# Patient Record
Sex: FEMALE | Race: WHITE | NOT HISPANIC OR LATINO | URBAN - METROPOLITAN AREA
[De-identification: names, ages, dates, MRNs, and addresses within clinical notes are randomized per-mention and may not be internally consistent; named-entity substitution may affect disease eponyms.]

---

## 2023-08-31 ENCOUNTER — TELEPHONE (OUTPATIENT)
Dept: ENDOSCOPY | Facility: HOSPITAL | Age: 58
End: 2023-08-31
Payer: COMMERCIAL

## 2023-08-31 NOTE — TELEPHONE ENCOUNTER
----- Message from Susana Dawkins sent at 8/31/2023  4:45 PM CDT -----  Regarding: Appt  Contact: Pt [phone]  Pt is calling to schedule a appt please call

## 2023-09-12 ENCOUNTER — TELEPHONE (OUTPATIENT)
Dept: ENDOSCOPY | Facility: HOSPITAL | Age: 58
End: 2023-09-12
Payer: COMMERCIAL

## 2023-09-12 NOTE — TELEPHONE ENCOUNTER
----- Message from Korin Law sent at 9/12/2023  4:24 PM CDT -----  Regarding: New Pt Harry Pancreas Cyst or Lesion  Contact: [phone]  Pt and [facility] rep (Maame) on the phone conference requesting an appointment for Pancreas Lesion/Cyst.  I attempted to schedule with no available pt requesting to see Dr. Jacobson.  Please call pt to discuss further.

## 2023-09-14 ENCOUNTER — TELEPHONE (OUTPATIENT)
Dept: ENDOSCOPY | Facility: HOSPITAL | Age: 58
End: 2023-09-14
Payer: COMMERCIAL

## 2023-09-14 NOTE — TELEPHONE ENCOUNTER
----- Message from Ernie Pizarro sent at 9/14/2023  8:22 AM CDT -----  Regarding: appt access  Contact: pt [phone]  PATIENT CALL    Pt returning Candice's call to schedule an appt to address pancreatic cysts/lesions. Referral in hand from PCP. Please call pt at [phone] to discuss

## 2023-11-02 ENCOUNTER — PATIENT MESSAGE (OUTPATIENT)
Dept: ENDOSCOPY | Facility: HOSPITAL | Age: 58
End: 2023-11-02
Payer: COMMERCIAL

## 2023-11-02 ENCOUNTER — TELEPHONE (OUTPATIENT)
Dept: ENDOSCOPY | Facility: HOSPITAL | Age: 58
End: 2023-11-02
Payer: COMMERCIAL

## 2023-11-02 ENCOUNTER — OFFICE VISIT (OUTPATIENT)
Dept: GASTROENTEROLOGY | Facility: CLINIC | Age: 58
End: 2023-11-02
Payer: COMMERCIAL

## 2023-11-02 VITALS
HEIGHT: 66 IN | BODY MASS INDEX: 24.1 KG/M2 | HEART RATE: 77 BPM | SYSTOLIC BLOOD PRESSURE: 120 MMHG | WEIGHT: 149.94 LBS | DIASTOLIC BLOOD PRESSURE: 73 MMHG

## 2023-11-02 DIAGNOSIS — K86.2 PANCREAS CYST: Primary | ICD-10-CM

## 2023-11-02 DIAGNOSIS — R63.4 WEIGHT LOSS: ICD-10-CM

## 2023-11-02 DIAGNOSIS — R10.11 RUQ PAIN: ICD-10-CM

## 2023-11-02 DIAGNOSIS — E11.65 UNCONTROLLED TYPE 2 DIABETES MELLITUS WITH HYPERGLYCEMIA: ICD-10-CM

## 2023-11-02 PROCEDURE — 99999 PR PBB SHADOW E&M-EST. PATIENT-LVL III: CPT | Mod: PBBFAC,,, | Performed by: INTERNAL MEDICINE

## 2023-11-02 PROCEDURE — 99204 PR OFFICE/OUTPT VISIT, NEW, LEVL IV, 45-59 MIN: ICD-10-PCS | Mod: S$GLB,,, | Performed by: INTERNAL MEDICINE

## 2023-11-02 PROCEDURE — 1160F RVW MEDS BY RX/DR IN RCRD: CPT | Mod: CPTII,S$GLB,, | Performed by: INTERNAL MEDICINE

## 2023-11-02 PROCEDURE — 99999 PR PBB SHADOW E&M-EST. PATIENT-LVL III: ICD-10-PCS | Mod: PBBFAC,,, | Performed by: INTERNAL MEDICINE

## 2023-11-02 PROCEDURE — 3078F PR MOST RECENT DIASTOLIC BLOOD PRESSURE < 80 MM HG: ICD-10-PCS | Mod: CPTII,S$GLB,, | Performed by: INTERNAL MEDICINE

## 2023-11-02 PROCEDURE — 1160F PR REVIEW ALL MEDS BY PRESCRIBER/CLIN PHARMACIST DOCUMENTED: ICD-10-PCS | Mod: CPTII,S$GLB,, | Performed by: INTERNAL MEDICINE

## 2023-11-02 PROCEDURE — 3074F SYST BP LT 130 MM HG: CPT | Mod: CPTII,S$GLB,, | Performed by: INTERNAL MEDICINE

## 2023-11-02 PROCEDURE — 3008F PR BODY MASS INDEX (BMI) DOCUMENTED: ICD-10-PCS | Mod: CPTII,S$GLB,, | Performed by: INTERNAL MEDICINE

## 2023-11-02 PROCEDURE — 1159F MED LIST DOCD IN RCRD: CPT | Mod: CPTII,S$GLB,, | Performed by: INTERNAL MEDICINE

## 2023-11-02 PROCEDURE — 99204 OFFICE O/P NEW MOD 45 MIN: CPT | Mod: S$GLB,,, | Performed by: INTERNAL MEDICINE

## 2023-11-02 PROCEDURE — 1159F PR MEDICATION LIST DOCUMENTED IN MEDICAL RECORD: ICD-10-PCS | Mod: CPTII,S$GLB,, | Performed by: INTERNAL MEDICINE

## 2023-11-02 PROCEDURE — 3078F DIAST BP <80 MM HG: CPT | Mod: CPTII,S$GLB,, | Performed by: INTERNAL MEDICINE

## 2023-11-02 PROCEDURE — 3008F BODY MASS INDEX DOCD: CPT | Mod: CPTII,S$GLB,, | Performed by: INTERNAL MEDICINE

## 2023-11-02 PROCEDURE — 3074F PR MOST RECENT SYSTOLIC BLOOD PRESSURE < 130 MM HG: ICD-10-PCS | Mod: CPTII,S$GLB,, | Performed by: INTERNAL MEDICINE

## 2023-11-02 RX ORDER — TAMOXIFEN CITRATE 20 MG/1
20 TABLET ORAL
COMMUNITY
Start: 2023-09-19

## 2023-11-02 RX ORDER — INSULIN LISPRO 100 [IU]/ML
5 INJECTION, SOLUTION INTRAVENOUS; SUBCUTANEOUS
COMMUNITY
Start: 2023-08-17

## 2023-11-02 RX ORDER — SIMVASTATIN 40 MG/1
1 TABLET, FILM COATED ORAL DAILY
COMMUNITY
Start: 2022-11-17

## 2023-11-02 RX ORDER — CETIRIZINE HYDROCHLORIDE 10 MG/1
10 TABLET ORAL NIGHTLY
COMMUNITY

## 2023-11-02 RX ORDER — FLAXSEED OIL 1000 MG
1000 CAPSULE ORAL
COMMUNITY

## 2023-11-02 RX ORDER — SERTRALINE HYDROCHLORIDE 100 MG/1
100 TABLET, FILM COATED ORAL NIGHTLY
COMMUNITY

## 2023-11-02 RX ORDER — SITAGLIPTIN AND METFORMIN HYDROCHLORIDE 1000; 50 MG/1; MG/1
1 TABLET, FILM COATED, EXTENDED RELEASE ORAL 2 TIMES DAILY
COMMUNITY

## 2023-11-02 NOTE — TELEPHONE ENCOUNTER
Spoke to Pt to schedule procedure(s) Upper Endoscopy Ultrasound (EUS)       Physician to perform procedure(s) Dr. PATRICE Meraz  Date of Procedure (s) 11/8/23  Arrival Time 1:15 PM  Time of Procedure(s) 2:15 PM   Location of Procedure(s) [facility] [facility] [address] Floor  Type of Rx Prep sent to patient: N/A  Instructions provided to patient via [facility]    Patient was informed on the following information and verbalized understanding. Screening questionnaire reviewed with patient and complete. If procedure requires anesthesia, a responsible adult needs to be present to accompany the patient home, patient cannot drive after receiving anesthesia. Appointment details are tentative, especially check-in time. Patient will receive a prep-op call 4 days prior to confirm check-in time for procedure. If applicable the patient should contact their pharmacy to verify Rx for procedure prep is ready for pick-up. Patient was advised to call the scheduling department at [phone] if pharmacy states no Rx is available. Patient was advised to call the endoscopy scheduling department if any questions or concerns arise.      [facility] Scheduling Department

## 2023-11-02 NOTE — TELEPHONE ENCOUNTER
Telephoned pt to schedule urgent EUS with no answer.  Voicemail message left with direct contact number for pt to return call.

## 2023-11-02 NOTE — PROGRESS NOTES
Gastroenterology: [facility]      SUBJECTIVE:         Chief Complaint: Here for evaluation of a pancreatic cyst     History of Present Illness:  Patient is a 58 y.o. female presents with multiple pancreatic cyst. The cysts were first noted 3/2022. It's located in the head, the body.  It measures <10  mm in size on the most recent imaging.  It is not symptomatic. Previous studies include CT scan.   Since initial detection, the cyst has not increased in size. The pancreatic duct is not dilated.    Previous FNA of the cyst has not been done    Prior Imaging:    CT scan 7/10/2023    Pancreas: Subcentimeter hypodense cystic lesions scattered throughout the pancreas are unchanged in size and number since the 07/10/2023 CT abdomen pelvis. The largest lesion is located in the pancreatic body and measures 0.6 cm in longest dimension.   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 For instance, a pancreatic body lesion denoted by an arrow (601 b image 42). Some   of these lesions do not have visualized connections which is likely due to resolution and slice thickness. However, cystic lesions are also heavily favored to represent branch duct type IPMN's.     Personal/family factors:    There is not a family history of pancreatic cancer     The patient does smoke.    ECOG status 0 - Asymptomatic    The patient stated worsening diabetes for the last 3 months and a weight loss of 14 pounds in 1.5 months. No diarrhea. Stated RUQ pain with food intake. Minimal used of NSAID's.    Review of Systems   Constitutional: Stated weight loss  Respiratory: no cough or shortness of breath   Cardiovascular: no chest pain or palpitations   Gastrointestinal: as per HPI      Past Medical History:   Diagnosis Date    Breast cancer     Hypertriglyceridemia     Type 2 diabetes mellitus without  "complications        History reviewed. No pertinent surgical history.    History reviewed. No pertinent family history.    Social History     Socioeconomic History    Marital status:    Tobacco Use    Smoking status: Every Day     Types: Cigarettes       Current Outpatient Medications on File Prior to Visit   Medication Sig Dispense Refill    cetirizine (ZYRTEC) 10 MG tablet Take 10 mg by mouth every evening.      empagliflozin (JARDIANCE) 25 mg tablet Take 25 mg by mouth.      flaxseed oiL 1,000 mg Cap Take 1,000 mg by mouth.      insulin lispro 100 unit/mL injection Inject 5 Units into the skin.      JANUMET XR 50-1,000 mg TM24 Take 1 tablet by mouth 2 (two) times daily.      multivitamin-minerals-lutein Tab Take 1 tablet by mouth once daily.      sertraline (ZOLOFT) 100 MG tablet Take 100 mg by mouth every evening.      simvastatin (ZOCOR) 40 MG tablet Take 1 tablet by mouth once daily.      tamoxifen (NOLVADEX) 20 MG Tab Take 20 mg by mouth.       No current facility-administered medications on file prior to visit.       Review of patient's allergies indicates:  No Known Allergies    Physical Exam:  General: Well-developed, well-appearing, no acute distress          Laboratory:   Lab Results   Component Value Date    ALT 14 10/11/2023    AST 20 10/11/2023    ALKPHOS 39 10/11/2023    BILITOT 0.4 10/11/2023     No results found for: "WBC", "HGB", "HCT", "MCV", "PLT"  No results found for: "INR", "PROTIME"        Diagnostic/Imaging Results:  As above    ASSESSMENT/PLAN:     1)Multiple pancreatic cyst in the head, the body. Measuring < 10 mm in size,  unchanged  Most likely etiology at this point is a IPMN    We discussed in detail the natural history of pancreatic cysts, the therapy involved, and the benefits of multimodality surveillance imaging including Ct, MRI, and EUS with FNA  2) Weight loss  3) RUQ abdominal pain   4) Worsening diabetes    Plan:     Pancreatic cyst- Will tentatively plan for EUS; " pending final review and consensus discussion at multidisciplinary pancreatic cyst conference.      The impression and plan was discussed in detail with the patient and family. All questions have been answered and the patient voices understanding of our plan at this point. The risk of the procedure was discussed in detail which includes but not limited to bleeding, infection, perforation in some cases requiring surgery with its spectrum of complications.      Need to upload the outside facility imaging.    David Magdaleno MD  [facility]  Department of Gastroenterology

## 2023-11-07 ENCOUNTER — TELEPHONE (OUTPATIENT)
Dept: ENDOSCOPY | Facility: HOSPITAL | Age: 58
End: 2023-11-07
Payer: COMMERCIAL

## 2023-11-07 ENCOUNTER — ANESTHESIA EVENT (OUTPATIENT)
Dept: ENDOSCOPY | Facility: HOSPITAL | Age: 58
End: 2023-11-07
Payer: COMMERCIAL

## 2023-11-07 NOTE — TELEPHONE ENCOUNTER
Left message instructing patient to call dept @ [phone] between 8am-4pm.    Arrival time to be given @ *121  (Message sent via [facility] portal)

## 2023-11-07 NOTE — ANESTHESIA PREPROCEDURE EVALUATION
[facility]  Anesthesia Pre-Operative Evaluation         Patient Name: Chas Malik  YOB: 1965  MRN: [id number]    SUBJECTIVE:     11/07/2023    Procedure(s) (LRB):  ULTRASOUND, UPPER GI TRACT, ENDOSCOPIC (N/A)    Chas Malik is a 58 y.o. female here for Procedure(s) (LRB):  ULTRASOUND, UPPER GI TRACT, ENDOSCOPIC (N/A)    Drips:     There is no problem list on file for this patient.      Review of patient's allergies indicates:  No Known Allergies    No current facility-administered medications on file prior to encounter.     Current Outpatient Medications on File Prior to Encounter   Medication Sig Dispense Refill    cetirizine (ZYRTEC) 10 MG tablet Take 10 mg by mouth every evening.      empagliflozin (JARDIANCE) 25 mg tablet Take 25 mg by mouth.      flaxseed oiL 1,000 mg Cap Take 1,000 mg by mouth.      insulin lispro 100 unit/mL injection Inject 5 Units into the skin.      JANUMET XR 50-1,000 mg TM24 Take 1 tablet by mouth 2 (two) times daily.      multivitamin-minerals-lutein Tab Take 1 tablet by mouth once daily.      sertraline (ZOLOFT) 100 MG tablet Take 100 mg by mouth every evening.      simvastatin (ZOCOR) 40 MG tablet Take 1 tablet by mouth once daily.      tamoxifen (NOLVADEX) 20 MG Tab Take 20 mg by mouth.         No past surgical history on file.    Social History     Socioeconomic History    Marital status:    Tobacco Use    Smoking status: Every Day     Types: Cigarettes         OBJECTIVE:     Vital Signs Range (Last 24H):       Significant Labs:  Lab Results   Component Value Date    ALT 14 10/11/2023    AST 20 10/11/2023     (H) 10/11/2023    K 3.7 10/11/2023    CREATININE 0.74 10/11/2023    BUN 13 10/11/2023    CO2 26 10/11/2023     Pre-op Assessment    I have reviewed the Patient Summary Reports.     I have reviewed the Nursing Notes. I have reviewed the NPO Status.   I have reviewed the Medications.     Review of Systems  Anesthesia Hx:  No problems  with previous Anesthesia   History of prior surgery of interest to airway management or planning:            Denies Personal Hx of Anesthesia complications.                    Social:  Smoker Stopped for 1 year and then recently restarted smoking       Hematology/Oncology:                      Current/Recent Cancer.  Breast    right axillary node dissection  chemotherapy and surgery       Cardiovascular:      Denies Hypertension.   Denies MI.            hyperlipidemia                             Pulmonary:  Pulmonary Normal   Denies COPD.  Denies Asthma.     Denies Sleep Apnea.                Renal/:  Renal/ Normal  Denies Chronic Renal Disease.                Hepatic/GI:     GERD Denies Liver Disease.            Neurological:  Neurology Normal Denies TIA.  Denies CVA.    Denies Seizures.                                Endocrine:  Diabetes Denies Hypothyroidism.  Denies Hyperthyroidism.             Physical Exam  General: Well nourished, Cooperative, Oriented and Alert    Airway:  Mallampati: II / II  Mouth Opening: Normal  TM Distance: Normal  Tongue: Normal    Dental:  Intact        Anesthesia Plan  Type of Anesthesia, risks & benefits discussed:    Anesthesia Type: Gen Natural Airway  Intra-op Monitoring Plan: Standard ASA Monitors  Post Op Pain Control Plan: multimodal analgesia  Induction:  IV  Informed Consent: Informed consent signed with the Patient and all parties understand the risks and agree with anesthesia plan.  All questions answered.   ASA Score: 3  Day of Surgery Review of History & Physical: H&P Update referred to the surgeon/provider.  Anesthesia Plan Notes:         Ready For Surgery From Anesthesia Perspective.     .

## 2023-11-08 ENCOUNTER — HOSPITAL ENCOUNTER (OUTPATIENT)
Facility: HOSPITAL | Age: 58
Discharge: HOME | End: 2023-11-08
Attending: INTERNAL MEDICINE | Admitting: INTERNAL MEDICINE
Payer: COMMERCIAL

## 2023-11-08 ENCOUNTER — ANESTHESIA (OUTPATIENT)
Dept: ENDOSCOPY | Facility: HOSPITAL | Age: 58
End: 2023-11-08
Payer: COMMERCIAL

## 2023-11-08 VITALS
DIASTOLIC BLOOD PRESSURE: 66 MMHG | HEART RATE: 70 BPM | HEIGHT: 66 IN | BODY MASS INDEX: 23.78 KG/M2 | OXYGEN SATURATION: 98 % | WEIGHT: 148 LBS | RESPIRATION RATE: 18 BRPM | SYSTOLIC BLOOD PRESSURE: 157 MMHG | TEMPERATURE: 98 F

## 2023-11-08 DIAGNOSIS — K86.2 PANCREAS CYST: ICD-10-CM

## 2023-11-08 LAB — POCT GLUCOSE: 91 MG/DL (ref 70–110)

## 2023-11-08 PROCEDURE — 43239 EGD BIOPSY SINGLE/MULTIPLE: CPT | Mod: 51,,, | Performed by: INTERNAL MEDICINE

## 2023-11-08 PROCEDURE — 88305 TISSUE EXAM BY PATHOLOGIST: CPT | Performed by: PATHOLOGY

## 2023-11-08 PROCEDURE — 88305 TISSUE EXAM BY PATHOLOGIST: ICD-10-PCS | Mod: 26,,, | Performed by: PATHOLOGY

## 2023-11-08 PROCEDURE — 27201012 HC FORCEPS, HOT/COLD, DISP: Performed by: INTERNAL MEDICINE

## 2023-11-08 PROCEDURE — 88342 CHG IMMUNOCYTOCHEMISTRY: ICD-10-PCS | Mod: 26,,, | Performed by: PATHOLOGY

## 2023-11-08 PROCEDURE — 88342 IMHCHEM/IMCYTCHM 1ST ANTB: CPT | Mod: 26,,, | Performed by: PATHOLOGY

## 2023-11-08 PROCEDURE — 88305 TISSUE EXAM BY PATHOLOGIST: CPT | Mod: 26,,, | Performed by: PATHOLOGY

## 2023-11-08 PROCEDURE — 43259 EGD US EXAM DUODENUM/JEJUNUM: CPT | Mod: ,,, | Performed by: INTERNAL MEDICINE

## 2023-11-08 PROCEDURE — 43259 EGD US EXAM DUODENUM/JEJUNUM: CPT | Performed by: INTERNAL MEDICINE

## 2023-11-08 PROCEDURE — 25000003 PHARM REV CODE 250: Performed by: NURSE ANESTHETIST, CERTIFIED REGISTERED

## 2023-11-08 PROCEDURE — 37000008 HC ANESTHESIA 1ST 15 MINUTES: Performed by: INTERNAL MEDICINE

## 2023-11-08 PROCEDURE — 43239 EGD BIOPSY SINGLE/MULTIPLE: CPT | Performed by: INTERNAL MEDICINE

## 2023-11-08 PROCEDURE — 25000003 PHARM REV CODE 250: Performed by: INTERNAL MEDICINE

## 2023-11-08 PROCEDURE — 63600175 PHARM REV CODE 636 W HCPCS: Performed by: NURSE ANESTHETIST, CERTIFIED REGISTERED

## 2023-11-08 PROCEDURE — D9220A PRA ANESTHESIA: ICD-10-PCS | Mod: ANES,,, | Performed by: ANESTHESIOLOGY

## 2023-11-08 PROCEDURE — D9220A PRA ANESTHESIA: Mod: CRNA,,, | Performed by: NURSE ANESTHETIST, CERTIFIED REGISTERED

## 2023-11-08 PROCEDURE — D9220A PRA ANESTHESIA: ICD-10-PCS | Mod: CRNA,,, | Performed by: NURSE ANESTHETIST, CERTIFIED REGISTERED

## 2023-11-08 PROCEDURE — 43259 PR ENDOSCOPIC ULTRASOUND EXAM: ICD-10-PCS | Mod: ,,, | Performed by: INTERNAL MEDICINE

## 2023-11-08 PROCEDURE — D9220A PRA ANESTHESIA: Mod: ANES,,, | Performed by: ANESTHESIOLOGY

## 2023-11-08 PROCEDURE — 37000009 HC ANESTHESIA EA ADD 15 MINS: Performed by: INTERNAL MEDICINE

## 2023-11-08 PROCEDURE — 43239 PR EGD, FLEX, W/BIOPSY, SGL/MULTI: ICD-10-PCS | Mod: 51,,, | Performed by: INTERNAL MEDICINE

## 2023-11-08 RX ORDER — PROPOFOL 10 MG/ML
VIAL (ML) INTRAVENOUS CONTINUOUS PRN
Status: DISCONTINUED | OUTPATIENT
Start: 2023-11-08 | End: 2023-11-08

## 2023-11-08 RX ORDER — SODIUM CHLORIDE 0.9 % (FLUSH) 0.9 %
10 SYRINGE (ML) INJECTION
Status: DISCONTINUED | OUTPATIENT
Start: 2023-11-08 | End: 2023-11-08 | Stop reason: HOSPADM

## 2023-11-08 RX ORDER — PROPOFOL 10 MG/ML
VIAL (ML) INTRAVENOUS
Status: DISCONTINUED | OUTPATIENT
Start: 2023-11-08 | End: 2023-11-08

## 2023-11-08 RX ORDER — LIDOCAINE HYDROCHLORIDE 20 MG/ML
INJECTION INTRAVENOUS
Status: DISCONTINUED | OUTPATIENT
Start: 2023-11-08 | End: 2023-11-08

## 2023-11-08 RX ORDER — SODIUM CHLORIDE 9 MG/ML
INJECTION, SOLUTION INTRAVENOUS CONTINUOUS
Status: DISCONTINUED | OUTPATIENT
Start: 2023-11-08 | End: 2023-11-08 | Stop reason: HOSPADM

## 2023-11-08 RX ORDER — EZETIMIBE 10 MG/1
10 TABLET ORAL DAILY
COMMUNITY

## 2023-11-08 RX ADMIN — SODIUM CHLORIDE: 9 INJECTION, SOLUTION INTRAVENOUS at 02:11

## 2023-11-08 RX ADMIN — GLYCOPYRROLATE 0.1 MG: 0.2 INJECTION, SOLUTION INTRAMUSCULAR; INTRAVITREAL at 03:11

## 2023-11-08 RX ADMIN — PROPOFOL 70 MG: 10 INJECTION, EMULSION INTRAVENOUS at 03:11

## 2023-11-08 RX ADMIN — TOPICAL ANESTHETIC 1 EACH: 200 SPRAY DENTAL; PERIODONTAL at 03:11

## 2023-11-08 RX ADMIN — PROPOFOL 30 MG: 10 INJECTION, EMULSION INTRAVENOUS at 03:11

## 2023-11-08 RX ADMIN — PROPOFOL 150 MCG/KG/MIN: 10 INJECTION, EMULSION INTRAVENOUS at 03:11

## 2023-11-08 RX ADMIN — LIDOCAINE HYDROCHLORIDE 50 MG: 20 INJECTION, SOLUTION INTRAVENOUS at 03:11

## 2023-11-08 NOTE — ANESTHESIA POSTPROCEDURE EVALUATION
Anesthesia Post Evaluation    Patient: Chas Malik    Procedure(s) Performed: Procedure(s) (LRB):  ULTRASOUND, UPPER GI TRACT, ENDOSCOPIC (N/A)    Final Anesthesia Type: general      Patient location during evaluation: GI PACU  Patient participation: Yes- Able to Participate  Level of consciousness: awake and alert  Post-procedure vital signs: reviewed and stable  Pain management: adequate  Airway patency: patent    PONV status at discharge: No PONV  Anesthetic complications: no      Cardiovascular status: blood pressure returned to baseline and hemodynamically stable  Respiratory status: unassisted  Hydration status: euvolemic  Follow-up not needed.          Vitals Value Taken Time   /53 11/08/23 1624   Temp 36.5 °C (97.7 °F) 11/08/23 1624   Pulse 68 11/08/23 1624   Resp 16 11/08/23 1624   SpO2 96 % 11/08/23 1624         No case tracking events are documented in the log.      Pain/Xena Score: No data recorded

## 2023-11-08 NOTE — H&P
Short Stay Endoscopy History and Physical    PCP - No, Primary Doctor  Referring Physician - David Magdaleno MD  [address] [place] [address]    Procedure - EGD/EUS  ASA - per anesthesia  Mallampati - per anesthesia  History of Anesthesia problems - per anesthesia  Family history Anesthesia problems -  per anesthesia   Plan of anesthesia - per anesthesia    HPI:  This is a 58 y.o. female here for evaluation of: EGD/EUS for abdo pain and known pancreas cysts likely IPMNs, which were previously stable on imaging with CT panc protocol outside in July 2023 (6mm largest in body).    Reports abdo pains right sided with radiation to back. Some weight loss and also worsening diabetes.    Reflux - no  Dysphagia - no  Abdominal pain - yes right side with some radiation to back  Diarrhea - no    ROS:  Constitutional: No fevers, chills, No weight loss  CV: No chest pain  Pulm: No cough, No shortness of breath  Ophtho: No vision changes  GI: see HPI  Derm: No rash    Medical History:  has a past medical history of Breast cancer, Hypertriglyceridemia, and Type 2 diabetes mellitus without complications.    Surgical History:  has no past surgical history on file.    Family History: family history is not on file..    Social History:  reports that she has been smoking cigarettes. She does not have any smokeless tobacco history on file.    Review of patient's allergies indicates:   Allergen Reactions    Bactrim [sulfamethoxazole-trimethoprim] Hives    Sulfa (sulfonamide antibiotics) Hives    Adhesive Rash       Medications:   Medications Prior to Admission   Medication Sig Dispense Refill Last Dose    cetirizine (ZYRTEC) 10 MG tablet Take 10 mg by mouth every evening.   Past Week    ezetimibe (ZETIA) 10 mg tablet Take 10 mg by mouth once daily.   11/7/2023    flaxseed oiL 1,000 mg Cap Take 1,000 mg by mouth.   11/7/2023    insulin lispro 100 unit/mL injection Inject 5 Units into the skin.   11/7/2023    JANUMET  XR 50-1,000 mg TM24 Take 1 tablet by mouth 2 (two) times daily.   11/7/2023    multivitamin-minerals-lutein Tab Take 1 tablet by mouth once daily.   11/7/2023    sertraline (ZOLOFT) 100 MG tablet Take 100 mg by mouth every evening.   11/7/2023    simvastatin (ZOCOR) 40 MG tablet Take 1 tablet by mouth once daily.   11/7/2023    tamoxifen (NOLVADEX) 20 MG Tab Take 20 mg by mouth.   11/7/2023    empagliflozin (JARDIANCE) 25 mg tablet Take 25 mg by mouth.   Unknown       Physical Exam:    Vital Signs:   Vitals:    11/08/23 1418   BP: 115/60   Pulse: 70   Resp: 18   Temp: 98.1 °F (36.7 °C)       General Appearance: Well appearing in no acute distress    Labs:  Lab Results   Component Value Date    ALT 14 10/11/2023    AST 20 10/11/2023     (H) 10/11/2023    K 3.7 10/11/2023    CREATININE 0.74 10/11/2023    BUN 13 10/11/2023    CO2 26 10/11/2023       I have explained the risks and benefits of this endoscopic procedure to the patient including but not limited to bleeding, inflammation, infection, perforation, pancreatitis, missing a lesion and death.      Andrew Meraz MD

## 2023-11-08 NOTE — TRANSFER OF CARE
"Anesthesia Transfer of Care Note    Patient: Chas Malik    Procedure(s) Performed: Procedure(s) (LRB):  ULTRASOUND, UPPER GI TRACT, ENDOSCOPIC (N/A)    Patient location: GI    Anesthesia Type: general    Transport from OR: Transported from OR on room air with adequate spontaneous ventilation    Post pain: adequate analgesia    Post assessment: no apparent anesthetic complications and tolerated procedure well    Post vital signs: stable    Level of consciousness: responds to stimulation and sedated    Nausea/Vomiting: no nausea/vomiting    Complications: none    Transfer of care protocol was followed      Last vitals: Visit Vitals  /60 (BP Location: Left arm, Patient Position: Lying)   Pulse 70   Temp 36.7 °C (98.1 °F) (Skin)   Resp 18   Ht 5' 6" (1.676 m)   Wt 67.1 kg (148 lb)   SpO2 95%   Breastfeeding No   BMI 23.89 kg/m²     "

## 2023-11-08 NOTE — [employer] PATIENT INSTRUCTIONS
Discharge Summary/Instructions after an Endoscopic Procedure  Patient Name: Chas Malik  Patient MRN: [id number]  Patient YOB: 1965 Wednesday, November 8, 2023  Andrew Meraz MD  Dear patient,  As a result of recent federal legislation (The Federal Cures Act), you may   receive lab or pathology results from your procedure in your [facility]   account before your physician is able to contact you. Your physician or   their representative will relay the results to you with their   recommendations at their soonest availability.  Thank you,  Your health is very important to us during the Covid Crisis. Following your   procedure today, you will receive a daily text for 2 weeks asking about   signs or symptoms of Covid 19.  Please respond to this text when you   receive it so we can follow up and keep you as safe as possible.   RESTRICTIONS:  During your procedure today, you received medications for sedation.  These   medications may affect your judgment, balance and coordination.  Therefore,   for 24 hours, you have the following restrictions:   - DO NOT drive a car, operate machinery, make legal/financial decisions,   sign important papers or drink alcohol.    ACTIVITY:  Today: no heavy lifting, straining or running due to procedural   sedation/anesthesia.  The following day: return to full activity including work.  DIET:  Eat and drink normally unless instructed otherwise.     TREATMENT FOR COMMON SIDE EFFECTS:  - Mild abdominal pain, nausea, belching, bloating or excessive gas:  rest,   eat lightly and use a heating pad.  - Sore Throat: treat with throat lozenges and/or gargle with warm salt   water.  - Because air was used during the procedure, expelling large amounts of air   from your rectum or belching is normal.  - If a bowel prep was taken, you may not have a bowel movement for 1-3 days.    This is normal.  SYMPTOMS TO WATCH FOR AND REPORT TO YOUR PHYSICIAN:  1. Abdominal pain or bloating, other than  gas cramps.  2. Chest pain.  3. Back pain.  4. Signs of infection such as: chills or fever occurring within 24 hours   after the procedure.  5. Rectal bleeding, which would show as bright red, maroon, or black stools.   (A tablespoon of blood from the rectum is not serious, especially if   hemorrhoids are present.)  6. Vomiting.  7. Weakness or dizziness.  GO DIRECTLY TO THE NEAREST EMERGENCY ROOM IF YOU HAVE ANY OF THE FOLLOWING:      Difficulty breathing              Chills and/or fever over 101 F   Persistent vomiting and/or vomiting blood   Severe abdominal pain   Severe chest pain   Black, tarry stools   Bleeding- more than one tablespoon   Any other symptom or condition that you feel may need urgent attention  Your doctor recommends these additional instructions:  If any biopsies were taken, your doctors clinic will contact you in 1 to 2   weeks with any results.  - Discharge patient to home (ambulatory).   - Patient has a contact number available for emergencies.  The signs and   symptoms of potential delayed complications were discussed with the   patient.  Return to normal activities tomorrow.  Written discharge   instructions were provided to the patient.   - Resume previous diet.   - Await path results from gastric biopsies.   - Perform MRCP with contrast in 3 months at [facility] to eval for whether   patient has small pancreas cysts or not.   - Return to referring physician.  For questions, problems or results please call your physician - Andrew Meraz MD.  EMERGENCY PHONE NUMBER: [phone],  LAB RESULTS: [phone]  IF A COMPLICATION OR EMERGENCY SITUATION ARISES AND YOU ARE UNABLE TO REACH   YOUR PHYSICIAN - GO DIRECTLY TO THE EMERGENCY ROOM.  Andrew Meraz MD  11/8/2023 4:42:38 PM  This report has been verified and signed electronically.  Dear patient,  As a result of recent federal legislation (The Federal Cures Act), you may   receive lab or pathology results from your procedure in your  [facility]   account before your physician is able to contact you. Your physician or   their representative will relay the results to you with their   recommendations at their soonest availability.  Thank you,  [employer]

## 2023-11-15 LAB
FINAL PATHOLOGIC DIAGNOSIS: NORMAL
Lab: NORMAL

## 2023-12-19 ENCOUNTER — TELEPHONE (OUTPATIENT)
Dept: ENDOSCOPY | Facility: HOSPITAL | Age: 58
End: 2023-12-19
Payer: COMMERCIAL

## 2023-12-19 DIAGNOSIS — K86.2 PANCREAS CYST: Primary | ICD-10-CM

## 2023-12-22 ENCOUNTER — TELEPHONE (OUTPATIENT)
Dept: ENDOSCOPY | Facility: HOSPITAL | Age: 58
End: 2023-12-22
Payer: COMMERCIAL

## 2023-12-22 NOTE — TELEPHONE ENCOUNTER
LVM per Dr. Meraz note MRCP scheduled 3 months after last EUS. Direct contact left for further questions.